# Patient Record
Sex: MALE | Race: WHITE | NOT HISPANIC OR LATINO | Employment: OTHER | URBAN - METROPOLITAN AREA
[De-identification: names, ages, dates, MRNs, and addresses within clinical notes are randomized per-mention and may not be internally consistent; named-entity substitution may affect disease eponyms.]

---

## 2023-08-22 ENCOUNTER — OFFICE VISIT (OUTPATIENT)
Dept: INTERNAL MEDICINE | Facility: CLINIC | Age: 43
End: 2023-08-22

## 2023-08-22 VITALS — WEIGHT: 215 LBS | SYSTOLIC BLOOD PRESSURE: 170 MMHG | DIASTOLIC BLOOD PRESSURE: 100 MMHG

## 2023-08-22 DIAGNOSIS — R73.03 PREDIABETES: ICD-10-CM

## 2023-08-22 DIAGNOSIS — Z00.00 ENCOUNTER FOR MEDICAL EXAMINATION TO ESTABLISH CARE: Primary | ICD-10-CM

## 2023-08-22 DIAGNOSIS — R06.81 WITNESSED EPISODE OF APNEA: ICD-10-CM

## 2023-08-22 DIAGNOSIS — I10 HYPERTENSION, UNSPECIFIED TYPE: ICD-10-CM

## 2023-08-22 RX ORDER — HYDROCHLOROTHIAZIDE 25 MG/1
25 TABLET ORAL DAILY
Qty: 30 TABLET | Refills: 2 | Status: SHIPPED | OUTPATIENT
Start: 2023-08-22

## 2023-08-22 NOTE — PROGRESS NOTES
"Chief Complaint  Establish Care, Sleep Apnea (Sleep issues ), and ADHD (Also concerns of ADHD)    Subjective        Jose Elias Velasco presents to [facility]  History of Present Illness  43-year-old male presenting with sleep apnea and to establish care.  Owns a construction company.  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  Has been told in the past that he is prediabetic but is unsure if where or when it was indicated.  States he is put on 20 pounds in the past couple months due to not going to the gym.  Previously on a strict keto diet and lost significant weight.  Does not drink sugary drinks.  Drinks plenty of water throughout the day.  Uses Splenda when needed to swing foods and drinks.  Has a family history of diabetes and heart disease.  Regularly vapes throughout the day.      Patient has not been seen by a healthcare provider in at least 15 years.  Has been told by multiple people that he has sleep apnea.  They have witnessed him have apneic episodes during the night.  States he is a \"brutal snorer\".  States he has been known to fall asleep at a traffic light he has been so tired.  Stays awake by drinking caffeinated beverages such as 0 sugar Monsters and coughing.    States he may have ADHD.  He has never been diagnosed but his mind wanders and he has difficulty remembering lists.    Objective   Vital Signs:  /100 (BP Location: Left arm, Patient Position: Sitting, Cuff Size: Adult)   Wt 97.5 kg (215 lb)   There is no height or weight on file to calculate BMI.             Physical Exam  Vitals reviewed.   Constitutional:       Appearance: Normal appearance. He is obese.   Musculoskeletal:         General: Normal range of motion.      Cervical back: Normal range of motion.   Skin:     General: Skin is warm and dry.      Capillary Refill: Capillary refill takes less than 2 seconds.   Neurological:      General: No focal deficit present.      Mental " Status: He is alert and oriented to person, place, and time.   Psychiatric:         Mood and Affect: Mood normal.         Behavior: Behavior normal.         Thought Content: Thought content normal.         Judgment: Judgment normal.      Result Review :        No current outpatient medications on file prior to visit.     No current facility-administered medications on file prior to visit.                Assessment and Plan   Diagnoses and all orders for this visit:    1. Encounter for medical examination to establish care (Primary)  -     Comprehensive Metabolic Panel; Future  -     Urinalysis With Microscopic If Indicated (No Culture) - Urine, Clean Catch; Future  -     Lipid Panel With / Chol / HDL Ratio; Future  -     Hemoglobin A1c; Future  -     TSH Rfx On Abnormal To Free T4; Future  -     CBC & Differential; Future    2. Witnessed episode of apnea  -     Ambulatory Referral to Sleep Medicine  -     Comprehensive Metabolic Panel; Future  -     Urinalysis With Microscopic If Indicated (No Culture) - Urine, Clean Catch; Future  -     Lipid Panel With / Chol / HDL Ratio; Future  -     Hemoglobin A1c; Future  -     TSH Rfx On Abnormal To Free T4; Future  -     CBC & Differential; Future    3. Prediabetes  -     Comprehensive Metabolic Panel; Future  -     Urinalysis With Microscopic If Indicated (No Culture) - Urine, Clean Catch; Future  -     Lipid Panel With / Chol / HDL Ratio; Future  -     Hemoglobin A1c; Future    4. Hypertension, unspecified type  -     hydroCHLOROthiazide (HYDRODIURIL) 25 MG tablet; Take 1 tablet by mouth Daily.  Dispense: 30 tablet; Refill: 2      Patient Instructions   Start hydrochlorothiazide 25 mg daily. Monitor blood pressure at home daily. Increase physical activity as tolerated. Limit salt intake. Stay hydrated with water. Referral to sleep medicine ordered. Scheduling center to call with appointment. Labs in the future. Follow-up in 2 weeks for recheck.         Follow Up   Return  in about 2 weeks (around 9/5/2023) for Recheck.  Patient was given instructions and counseling regarding his condition or for health maintenance advice. Please see specific information pulled into the AVS if appropriate.

## 2023-08-22 NOTE — PATIENT INSTRUCTIONS
Start hydrochlorothiazide 25 mg daily. Monitor blood pressure at home daily. Increase physical activity as tolerated. Limit salt intake. Stay hydrated with water. Referral to sleep medicine ordered. Scheduling center to call with appointment. Labs in the future. Follow-up in 2 weeks for recheck.

## 2023-09-05 ENCOUNTER — OFFICE VISIT (OUTPATIENT)
Dept: INTERNAL MEDICINE | Facility: CLINIC | Age: 43
End: 2023-09-05

## 2023-09-05 VITALS
DIASTOLIC BLOOD PRESSURE: 90 MMHG | HEART RATE: 88 BPM | WEIGHT: 215 LBS | OXYGEN SATURATION: 99 % | SYSTOLIC BLOOD PRESSURE: 152 MMHG

## 2023-09-05 DIAGNOSIS — R73.03 PREDIABETES: Primary | ICD-10-CM

## 2023-09-05 DIAGNOSIS — I10 HYPERTENSION, UNSPECIFIED TYPE: ICD-10-CM

## 2023-09-05 PROCEDURE — 99213 OFFICE O/P EST LOW 20 MIN: CPT

## 2023-09-05 NOTE — PROGRESS NOTES
Chief Complaint  Diabetes and Hypertension    Subjective        Jose Elias Velasco presents to [facility]  History of Present Illness  43-year-old male presenting for hypertension and diabetes follow-up.  Taking medication daily.  Has not been taking blood pressure daily.  Provided blood pressure readings of 141/89, 135/88, 128/85, 131/90.  Has made significant improvements in diet.  Has cut out most baked goods.  He has introduce more portion controls and prepped meals that are heavy and vegetables.  Still enjoys red meat.  Has also tried to introduce more tuna and salmon into his diet.  Denies headache, chest pain, shortness of breath.  Diabetes    Hypertension      Objective   Vital Signs:  /90   Pulse 88   Wt 97.5 kg (215 lb)   SpO2 99%   There is no height or weight on file to calculate BMI.             Physical Exam  Vitals reviewed.   Constitutional:       Appearance: Normal appearance.   Musculoskeletal:         General: Normal range of motion.      Cervical back: Normal range of motion.   Skin:     General: Skin is warm and dry.      Capillary Refill: Capillary refill takes less than 2 seconds.   Neurological:      General: No focal deficit present.      Mental Status: He is alert and oriented to person, place, and time.   Psychiatric:         Mood and Affect: Mood normal.         Behavior: Behavior normal.         Thought Content: Thought content normal.         Judgment: Judgment normal.      Result Review :    Common labs          8/29/2023    09:22   Common Labs   Glucose 148    BUN 23    Creatinine 0.94    Sodium 138    Potassium 3.9    Chloride 101    Calcium 9.6    Total Protein 7.0    Albumin 4.8    Total Bilirubin 0.5    Alkaline Phosphatase 60    AST (SGOT) 20    ALT (SGPT) 46    WBC 4.21    Hemoglobin 15.9    Hematocrit 45.0    Platelets 273    Total Cholesterol 253    Triglycerides 291    HDL Cholesterol 48    LDL Cholesterol  151    Hemoglobin A1C 7.50           Current Outpatient Medications on File Prior to Visit   Medication Sig Dispense Refill    hydroCHLOROthiazide (HYDRODIURIL) 25 MG tablet Take 1 tablet by mouth Daily. 30 tablet 2     No current facility-administered medications on file prior to visit.              Assessment and Plan   Diagnoses and all orders for this visit:    1. Prediabetes (Primary)    2. Hypertension, unspecified type      Patient Instructions   Continue medication as prescribed. Continue activity plan. Continue improving diet through portion control and limiting baked goods. Recommend red meat only twice a week. Monitor blood pressure at home. Follow-up in 3 months for annual physical and hemoglobin A1c.         Follow Up   Return in about 3 months (around 12/5/2023) for Annual physical.  Patient was given instructions and counseling regarding his condition or for health maintenance advice. Please see specific information pulled into the AVS if appropriate.

## 2023-09-05 NOTE — PATIENT INSTRUCTIONS
Continue medication as prescribed. Continue activity plan. Continue improving diet through portion control and limiting baked goods. Recommend red meat only twice a week. Monitor blood pressure at home. Follow-up in 3 months for annual physical and hemoglobin A1c.

## 2023-10-20 ENCOUNTER — OFFICE VISIT (OUTPATIENT)
Dept: SLEEP MEDICINE | Facility: HOSPITAL | Age: 43
End: 2023-10-20
Payer: MEDICAID

## 2023-10-20 VITALS
HEIGHT: 70 IN | OXYGEN SATURATION: 99 % | SYSTOLIC BLOOD PRESSURE: 136 MMHG | HEART RATE: 67 BPM | BODY MASS INDEX: 31.41 KG/M2 | WEIGHT: 219.4 LBS | DIASTOLIC BLOOD PRESSURE: 89 MMHG

## 2023-10-20 DIAGNOSIS — G47.30 SLEEP APNEA, UNSPECIFIED TYPE: Primary | ICD-10-CM

## 2023-10-20 DIAGNOSIS — G47.10 HYPERSOMNIA: ICD-10-CM

## 2023-10-20 DIAGNOSIS — R06.83 SNORING: ICD-10-CM

## 2023-10-20 DIAGNOSIS — G47.8 NON-RESTORATIVE SLEEP: ICD-10-CM

## 2023-10-20 DIAGNOSIS — E66.9 OBESITY (BMI 30-39.9): ICD-10-CM

## 2023-10-20 PROCEDURE — G0463 HOSPITAL OUTPT CLINIC VISIT: HCPCS

## 2023-10-20 NOTE — PROGRESS NOTES
"Sleep Disorders Center New Patient/Consultation       Reason for Consultation: Snoring and witnessed apneas      Patient Care Team:  Primitivo English APRN as PCP - General (Nurse Practitioner)  James Monreal MD as Consulting Physician (Sleep Medicine)      History of present illness:  Thank you for asking me to see your patient.  The patient is a 43 y.o. male with hypertension presents with concern for sleep disorder.  No history of prior sleep study or tonsillectomy.  Sleeps around 8 hours sleep latency 20 to 30 minutes no rotating shifts.  Patient reports snoring witnessed apneas ESS 11; hypersomnia nonrestorative sleep.      Social History: Reports e-cigarette usage since age 18 no alcohol usage no drug usage 2 caffeine beverages a day    Allergies:  Patient has no known allergies.    Family History: NICOLAS yes       Current Outpatient Medications:     hydroCHLOROthiazide (HYDRODIURIL) 25 MG tablet, Take 1 tablet by mouth Daily., Disp: 30 tablet, Rfl: 2    Vital Signs:    Vitals:    10/20/23 0900   BP: 136/89   Pulse: 67   SpO2: 99%   Weight: 99.5 kg (219 lb 6.4 oz)   Height: 177.8 cm (70\")      Body mass index is 31.48 kg/m².  Neck Circumference: 17 inches      REVIEW OF SYSTEMS.  Full review of systems available on the intake form which is scanned in the media tab.  The relevant positive are noted below  Daytime excessive sleepiness with [place] Sleepiness Scale :Total score: 11   Snoring  All negative      Physical exam:  Vitals:    10/20/23 0900   BP: 136/89   Pulse: 67   SpO2: 99%   Weight: 99.5 kg (219 lb 6.4 oz)   Height: 177.8 cm (70\")    Body mass index is 31.48 kg/m². Neck Circumference: 17 inches  HEENT: Head is atraumatic, normocephalic  Eyes: pupils are round equal and reacting to light and accommodation, conjunctiva normal  Throat: tongue normal  NECK:Neck Circumference: 17 inches  RESPIRATORY SYSTEM: Regular respirations  CARDIOVASULAR SYSTEM: Regular rate  EXTREMITES: No cyanosis, " clubbing  NEUROLOGICAL SYSTEM: Oriented x 3, no gross motor defects, gait normal      Impression:  1. Sleep apnea, unspecified type    2. Hypersomnia    3. Non-restorative sleep    4. Snoring    5. Obesity (BMI 30-39.9)        Plan:    Good sleep hygiene measures should be maintained.  Weight loss would be beneficial in this patient who is obese BMI 31.5.    I discussed the pathophysiology of obstructive sleep apnea with the patient.  We discussed the adverse outcomes associated with untreated sleep-disordered breathing.  We discussed treatment modalities of obstructive sleep apnea including CPAP device as well as oral mandibular advancement device. Sleep study will be scheduled to establish definitive diagnosis of sleep disorder breathing.  Weight loss will be strongly beneficial in order to reduce the severity of sleep-disordered breathing. Caution during activities that require prolonged concentration is strongly advised.  Patient will be notified of sleep study results after sleep study is completed.  If sleep apnea is only mild,  oral mandibular advancement device may be one of the treatment options.  However if sleep apnea is moderately severe, CPAP treatment will be strongly encouraged.      Patient is hesitant about CPAP.  Has inquired about inspire.  Recommend televideo visit after home sleep study sent to discuss results and treatment options.    Thank you for allowing me to participate in your patient's care.    James Monreal MD  Sleep Medicine  10/20/23  10:43 EDT

## 2023-10-23 ENCOUNTER — HOSPITAL ENCOUNTER (OUTPATIENT)
Dept: SLEEP MEDICINE | Facility: HOSPITAL | Age: 43
Discharge: HOME | End: 2023-10-23
Admitting: FAMILY MEDICINE

## 2023-10-23 DIAGNOSIS — G47.30 SLEEP APNEA, UNSPECIFIED TYPE: ICD-10-CM

## 2023-10-23 DIAGNOSIS — G47.8 NON-RESTORATIVE SLEEP: ICD-10-CM

## 2023-10-23 DIAGNOSIS — G47.10 HYPERSOMNIA: ICD-10-CM

## 2023-10-23 DIAGNOSIS — R06.83 SNORING: ICD-10-CM

## 2023-10-23 DIAGNOSIS — E66.9 OBESITY (BMI 30-39.9): ICD-10-CM

## 2023-10-23 PROCEDURE — 95806 SLEEP STUDY UNATT&RESP EFFT: CPT

## 2023-11-02 ENCOUNTER — TELEPHONE (OUTPATIENT)
Dept: SLEEP MEDICINE | Facility: HOSPITAL | Age: 43
End: 2023-11-02

## 2023-11-08 ENCOUNTER — OFFICE VISIT (OUTPATIENT)
Dept: SLEEP MEDICINE | Facility: HOSPITAL | Age: 43
End: 2023-11-08

## 2023-11-08 VITALS — HEIGHT: 70 IN | HEART RATE: 69 BPM | BODY MASS INDEX: 31.64 KG/M2 | OXYGEN SATURATION: 98 % | WEIGHT: 221 LBS

## 2023-11-08 DIAGNOSIS — R06.83 SNORING: ICD-10-CM

## 2023-11-08 DIAGNOSIS — G47.33 SEVERE OBSTRUCTIVE SLEEP APNEA: Primary | ICD-10-CM

## 2023-11-08 DIAGNOSIS — G47.10 HYPERSOMNIA: ICD-10-CM

## 2023-11-08 DIAGNOSIS — E66.9 OBESITY (BMI 30-39.9): ICD-10-CM

## 2023-11-08 DIAGNOSIS — G47.8 NON-RESTORATIVE SLEEP: ICD-10-CM

## 2023-11-08 PROCEDURE — G0463 HOSPITAL OUTPT CLINIC VISIT: HCPCS

## 2023-11-08 NOTE — PROGRESS NOTES
"Follow Up Sleep Disorders Center Note     Chief Complaint:  NICOLAS     Primary Care Physician: Primitivo English APRN    Interval History:   The patient is a 43 y.o. male  who was last seen in the sleep lab: 10/23/2023 for home sleep study which showed AHI 38.6 events per hour lowest SPO2 64%.  Presents today to discuss results and treatment options.  When I first saw patient he reported snoring witnessed apneas hypersomnia nonrestorative sleep.  He was hesitant about CPAP and had inquired about inspire therapy.    Review of Systems:    A complete review of systems was done and all were negative with the exception of all negative    Social History:    Social History     Socioeconomic History    Marital status: Single   Tobacco Use    Smoking status: Every Day     Types: Electronic Cigarette     Passive exposure: Current    Smokeless tobacco: Never   Vaping Use    Vaping Use: Some days    Substances: Nicotine, Flavoring, menthol    Devices: Disposable   Substance and Sexual Activity    Alcohol use: Not Currently    Drug use: Never    Sexual activity: Yes     Partners: Female       Allergies:  Patient has no known allergies.     Medication Review:  Reviewed.      Vital Signs:    Vitals:    11/08/23 0926   Pulse: 69   SpO2: 98%   Weight: 100 kg (221 lb)   Height: 177.8 cm (70\")     Body mass index is 31.71 kg/m².    Physical Exam:    Constitutional:  Well developed 43 y.o. male that appears in no apparent distress.  Awake & oriented times 3.  Normal mood with normal recent and remote memory and normal judgement.  Eyes:  Conjunctivae normal.  Oropharynx: Previously, moist mucous membranes.    Self-administered [place] Sleepiness Scale test results:  13  0-5 Lower normal daytime sleepiness  6-10 Higher normal daytime sleepiness  11-12 Mild, 13-15 Moderate, & 16-24 Severe excessive daytime sleepiness    Impression:   1. Severe obstructive sleep apnea    2. Hypersomnia    3. Non-restorative sleep    4. Snoring    5. Obesity (BMI " 30-39.9)      Discussed CPAP versus inspire in great detail.  Order for auto CPAP 8-20 cm H2O placed today.  Patient given list of DME's he will contact about purchasing a CPAP machine from.  Of note patient is self-pay.  Patient given information on cost of inspire device which is around $25,000.  Advised that further cost would need to be determined by facility where procedure would take place.    Discussed mask fitting and conditioning techniques and discussed set pressure settings can be changed for comfort and effectiveness as well and this can take some time as well as trial and error.  Discussed that in some rare cases sleep apnea is not appropriately treated with even maximum pressures of CPAP or may not be tolerable with CPAP and we may need to consider BiPAP to allow for higher, more comfortable and more effective pressures.  Patient expressed understanding.    Patient understands risk of untreated sleep apnea include stroke heart attack arrhythmias and dementia.    Plan:  Good sleep hygiene measures should be maintained.  Weight loss would be beneficial in this patient who obesity by Body mass index is 31.71 kg/m²..      Caution during activities that require prolonged concentration is strongly advised if sleepiness returns. Changing of PAP supplies regularly is important for effective use. Patient needs to change cushion on the mask or plugs on nasal pillows along with disposable filters once every month and change mask frame, tubing, headgear and Velcro straps every 6 months at the minimum.    I answered all of the patient's questions.  The patient will call for any problems and will follow up after at least 1 month use of Pap for follow-up.      James Monreal MD  Sleep Medicine  11/08/23  12:48 EST

## 2023-12-13 ENCOUNTER — OFFICE VISIT (OUTPATIENT)
Dept: INTERNAL MEDICINE | Facility: CLINIC | Age: 43
End: 2023-12-13

## 2023-12-13 ENCOUNTER — TELEPHONE (OUTPATIENT)
Dept: INTERNAL MEDICINE | Facility: CLINIC | Age: 43
End: 2023-12-13

## 2023-12-13 VITALS
HEART RATE: 87 BPM | WEIGHT: 216 LBS | HEIGHT: 70 IN | SYSTOLIC BLOOD PRESSURE: 138 MMHG | BODY MASS INDEX: 30.92 KG/M2 | OXYGEN SATURATION: 98 % | DIASTOLIC BLOOD PRESSURE: 84 MMHG

## 2023-12-13 DIAGNOSIS — Z11.59 ENCOUNTER FOR HEPATITIS C SCREENING TEST FOR LOW RISK PATIENT: ICD-10-CM

## 2023-12-13 DIAGNOSIS — Z00.00 ANNUAL PHYSICAL EXAM: Primary | ICD-10-CM

## 2023-12-13 DIAGNOSIS — R73.03 PREDIABETES: ICD-10-CM

## 2023-12-13 DIAGNOSIS — I10 HYPERTENSION, UNSPECIFIED TYPE: ICD-10-CM

## 2023-12-13 LAB
ALBUMIN SERPL-MCNC: 4.9 G/DL (ref 3.5–5.2)
ALBUMIN/GLOB SERPL: 2.7 G/DL
ALP SERPL-CCNC: 55 U/L (ref 39–117)
ALT SERPL-CCNC: 51 U/L (ref 1–41)
APPEARANCE UR: CLEAR
AST SERPL-CCNC: 30 U/L (ref 1–40)
BILIRUB SERPL-MCNC: 0.5 MG/DL (ref 0–1.2)
BILIRUB UR QL STRIP: NEGATIVE
BUN SERPL-MCNC: 18 MG/DL (ref 6–20)
BUN/CREAT SERPL: 16.5 (ref 7–25)
CALCIUM SERPL-MCNC: 9.8 MG/DL (ref 8.6–10.5)
CHLORIDE SERPL-SCNC: 104 MMOL/L (ref 98–107)
CO2 SERPL-SCNC: 25.3 MMOL/L (ref 22–29)
COLOR UR: YELLOW
CREAT SERPL-MCNC: 1.09 MG/DL (ref 0.76–1.27)
EGFRCR SERPLBLD CKD-EPI 2021: 86.9 ML/MIN/1.73
GLOBULIN SER CALC-MCNC: 1.8 GM/DL
GLUCOSE SERPL-MCNC: 137 MG/DL (ref 65–99)
GLUCOSE UR QL STRIP: NEGATIVE
HBA1C MFR BLD: 7 % (ref 4.8–5.6)
HGB UR QL STRIP: NEGATIVE
KETONES UR QL STRIP: NEGATIVE
LEUKOCYTE ESTERASE UR QL STRIP: NEGATIVE
NITRITE UR QL STRIP: NEGATIVE
PH UR STRIP: 5.5 [PH] (ref 5–8)
POTASSIUM SERPL-SCNC: 4.3 MMOL/L (ref 3.5–5.2)
PROT SERPL-MCNC: 6.7 G/DL (ref 6–8.5)
PROT UR QL STRIP: NEGATIVE
SODIUM SERPL-SCNC: 139 MMOL/L (ref 136–145)
SP GR UR STRIP: 1.03 (ref 1–1.03)
UROBILINOGEN UR STRIP-MCNC: NORMAL MG/DL

## 2023-12-13 PROCEDURE — 99396 PREV VISIT EST AGE 40-64: CPT

## 2023-12-13 NOTE — PROGRESS NOTES
"Chief Complaint  Annual Exam    Subjective        Jose Elias Velasco presents to [facility]  History of Present Illness  43-year-old male presenting for annual exam.  Wearing CPAP at night which has improved overall health significantly.  Has stopped taking hydrochlorothiazide due to causing headaches.  Blood pressure 138/84 today.  Has not had any swelling of lower extremities since.  Has been paying attention to what he has been eating and decreasing overall carbohydrate and sweets intake.  Still has occasional donuts and desserts but not as frequently.  Has not changed physical activity level.      Objective   Vital Signs:  /84 (BP Location: Right arm, Patient Position: Sitting, Cuff Size: Adult)   Pulse 87   Ht 177.8 cm (70\")   Wt 98 kg (216 lb)   SpO2 98%   BMI 30.99 kg/m²   Estimated body mass index is 30.99 kg/m² as calculated from the following:    Height as of this encounter: 177.8 cm (70\").    Weight as of this encounter: 98 kg (216 lb).       BMI is >= 30 and <35. (Class 1 Obesity). The following options were offered after discussion;: exercise counseling/recommendations and nutrition counseling/recommendations      Physical Exam  Vitals reviewed.   Constitutional:       Appearance: Normal appearance.   HENT:      Head: Normocephalic.      Right Ear: Tympanic membrane normal.      Left Ear: Tympanic membrane normal.      Nose: Nose normal.      Mouth/Throat:      Mouth: Mucous membranes are moist.      Pharynx: Oropharynx is clear.   Eyes:      Pupils: Pupils are equal, round, and reactive to light.   Cardiovascular:      Rate and Rhythm: Normal rate.      Pulses: Normal pulses.      Heart sounds: Normal heart sounds.   Pulmonary:      Effort: Pulmonary effort is normal.      Breath sounds: Normal breath sounds.   Abdominal:      General: Bowel sounds are normal.      Palpations: Abdomen is soft.   Musculoskeletal:         General: Normal range of motion.      Cervical " back: Normal range of motion.   Skin:     General: Skin is warm and dry.      Capillary Refill: Capillary refill takes less than 2 seconds.   Neurological:      General: No focal deficit present.      Mental Status: He is alert and oriented to person, place, and time.   Psychiatric:         Mood and Affect: Mood normal.         Behavior: Behavior normal.         Thought Content: Thought content normal.         Judgment: Judgment normal.        Result Review :    Common labs          8/29/2023    09:22 12/13/2023    09:24   Common Labs   Glucose 148  137    BUN 23  18    Creatinine 0.94  1.09    Sodium 138  139    Potassium 3.9  4.3    Chloride 101  104    Calcium 9.6  9.8    Total Protein 7.0  6.7    Albumin 4.8  4.9    Total Bilirubin 0.5  0.5    Alkaline Phosphatase 60  55    AST (SGOT) 20  30    ALT (SGPT) 46  51    WBC 4.21     Hemoglobin 15.9     Hematocrit 45.0     Platelets 273     Total Cholesterol 253     Triglycerides 291     HDL Cholesterol 48     LDL Cholesterol  151     Hemoglobin A1C 7.50  7.00                   Assessment and Plan   Diagnoses and all orders for this visit:    1. Annual physical exam (Primary)    2. Prediabetes  -     Comprehensive Metabolic Panel  -     Urinalysis With Microscopic If Indicated (No Culture) - Urine, Clean Catch  -     Hemoglobin A1c    3. Hypertension, unspecified type  -     Comprehensive Metabolic Panel  -     Urinalysis With Microscopic If Indicated (No Culture) - Urine, Clean Catch  -     Hemoglobin A1c    4. Encounter for hepatitis C screening test for low risk patient      Patient Instructions   Limit salt intake. Limit carbohydrates and sweets. Stay hydrated with water. Labs today. Follow-up in 3 months for recheck of blood pressure and blood sugar.    The patient was counseled regarding nutrition, physical activity, healthy weight, injury prevention, misuse of tobacco, alcohol and illicit drugs, mental health, immunizations, and screenings.           Follow Up    Return in about 3 months (around 3/13/2024) for Recheck.  Patient was given instructions and counseling regarding his condition or for health maintenance advice. Please see specific information pulled into the AVS if appropriate.

## 2023-12-13 NOTE — PATIENT INSTRUCTIONS
Limit salt intake. Limit carbohydrates and sweets. Stay hydrated with water. Labs today. Follow-up in 3 months for recheck of blood pressure and blood sugar.

## 2023-12-13 NOTE — TELEPHONE ENCOUNTER
Caller: Jose Elias Velasco    Relationship: Self    Best call back number: [phone]    What orders are you requesting (i.e. lab or imaging): METHYLATION PANEL    In what timeframe would the patient need to come in: WITH OTHER LABS    Where will you receive your lab/imaging services: IN OFFICE    Additional notes: PATIENT STATED THAT A FRIEND HAD MENTIONED GETTING THIS TEST INCLUDED TO HIS NORMAL BLOODWORK. PATIENT STATED THAT THEY HAD JUST DONE THE BLOODWORK THIS MORNING BUT WOULD LIKE TO SEE IF THIS CAN BE DONE AS WELL. STATED THAT IF IT IS NOT BENEFICIAL THEN DO NOT WORRY ABOUT IT. PLEASE CALL AND ADVISE

## 2023-12-14 NOTE — PROGRESS NOTES
Urinalysis is clear.  Metabolic panel shows elevated blood sugar of 137 in a nonfasting lab correlating with hemoglobin A1c of 7.0.  This is an improvement from 3 months ago where hemoglobin A1c of 7.5.  Continue to make improvements in diet that limit sweets and carbohydrates.  Slightly elevated ALT (liver enzyme).  No signs of kidney injury or electrolyte imbalance.  Stay hydrated with water and will continue to monitor in the future.